# Patient Record
(demographics unavailable — no encounter records)

---

## 2024-12-18 NOTE — ASSESSMENT
[FreeTextEntry1] : 12/15/2023 we will continue the current antihypertensive regimen in addition to this we discussed lipids and the LDL target of less than 100 repeat values are planned on a regimen of a natural statin    6/18/24 requesting information with respect to non-pharmacologic statin such as red rice yeast plain stress test requested most likely GI related GI follow-up suggested as well.  Addendum 6/18/24 seeing Dr. Gudino in [place] for GI evaluation  12/18/2024  Hyperlipidemia  reviewed blood work increasing total cholesterol 232 now 250 LDL was 130 now 147. low [place] risk  would mandate a DASH diet/Mediterranean diet. for now  fatigue  would consider obstructive sleep apnea now with BMI greater than 40 family being evaluated for inflammation although workup has been unrevealing refer to Dr. Wood for sleep study  history of strokes in her father along with myocardial infarction   carotid study in our office given family history of stroke  return in 6 months seeing Dr. Doss for general medical   high risk encounter based upon lab review and recommendation for subspecialty referral and review of blood work

## 2024-12-18 NOTE — REASON FOR VISIT
[Symptom and Test Evaluation] : symptom and test evaluation [CV Risk Factors and Non-Cardiac Disease] : CV risk factors and non-cardiac disease [Hypertension] : hypertension [Coronary Artery Disease] : coronary artery disease [Follow-Up - Clinic] : a clinic follow-up of [Supraventricular Tachycardia] : supraventricular tachycardia [FreeTextEntry3] : Dr Anjelica Morales [phone] [FreeTextEntry1] : Cara comes today for a follow up visit for an abnormal lipid profile. chest pains have resolved. EKG is stable Cara  knows that she must engage in primary risk reduction include weight reduction in dietary management  Update 4/13/2022 Grupo notes occasional chest pains which last for about 5 minutes otherwise we reviewed a log which reveal blood pressure in the 150-1 60 systolic range she previously had headache on labetalol  7/12/2022 Chest pains have resolved blood pressure under good control  12/29/22 Grupo is doing well overall we have reviewed bloodwork by phone which is satisfactory she will engage in primary risk reduction is anxious to avoid further pharmacotherapy  6/27/2023  No new cardiac complaint . works as a [employer] company in [place] asymptomatic vital signs stable responding nicely to antihypertensive regimen  12/15/2023 Grupo tells me that her father suffered from angina and coronary disease and we suspect that a elevation in C-reactive protein over 10 is a risk enhancer therefore would aim for an LDL cholesterol less than 100 Grupo is open to trying a natural statin such as red yeast rice but prefers to avoid the atorvastatin meanwhile her blood pressure remains under excellent control on the current regimen.  6/18/2024 Cara reports bouts of chest pains that wake her up out of sleep most consistent with reflux. she has a history of known gastritis and previously underwent GI evaluations she has taken Zantac in the past.  Blood work is reviewed reveals LDL cholesterol of 130 total cholesterol 232 blood sugar 111  7/2/2024 Compensated without cardiac signs or symptoms   12/18/2024 Julieta complains of mostly fatigue especially after waking in the morning she can go back to sleep immediately

## 2024-12-18 NOTE — DISCUSSION/SUMMARY
[EKG obtained to assist in diagnosis and management of assessed problem(s)] : EKG obtained to assist in diagnosis and management of assessed problem(s) [FreeTextEntry1] : I would consider carvedilol 6.25/day would increase losartan to 100 mg/day blood pressure target 140/80 normal coronaries at cath she had a history of a false positive nuclear stress test.  Maintain diuretic hydrochlorothiazide.  7/12/2022 With continue current regimen  12/29/22 continue cardiac regimen for hypertension close attention to lipid profile  6/27/2023  Grupo will call me tomorrow for review of blood work   7/2/2024 No new cardiac recommendations   Medical necessity This is a low-risk encounter  EKG indicated for hypertension

## 2025-06-19 NOTE — ASSESSMENT
[FreeTextEntry1] : 12/15/2023 we will continue the current antihypertensive regimen in addition to this we discussed lipids and the LDL target of less than 100 repeat values are planned on a regimen of a natural statin    6/18/24 requesting information with respect to non-pharmacologic statin such as red rice yeast plain stress test requested most likely GI related GI follow-up suggested as well.  Addendum 6/18/24 seeing Dr. Gudino in [place] for GI evaluation  12/18/2024  Hyperlipidemia  reviewed blood work increasing total cholesterol 232 now 250 LDL was 130 now 147. low [place] risk  would mandate a DASH diet/Mediterranean diet. for now  fatigue  would consider obstructive sleep apnea now with BMI greater than 40 family being evaluated for inflammation although workup has been unrevealing refer to Dr. Wood for sleep study  history of strokes in her father along with myocardial infarction   carotid study in our office given family history of stroke  return in 6 months seeing Dr. Doss for general medical   6/18/2025 Will continue nonpharmacologic approach for hyperlipidemia blood pressure control satisfactory on current medications return visit 6 to 8 months  Intermediate risk encounter based upon lab review and recommendation

## 2025-06-19 NOTE — PHYSICAL EXAM

## 2025-06-19 NOTE — REASON FOR VISIT
[Symptom and Test Evaluation] : symptom and test evaluation [CV Risk Factors and Non-Cardiac Disease] : CV risk factors and non-cardiac disease [Hypertension] : hypertension [Coronary Artery Disease] : coronary artery disease [Follow-Up - Clinic] : a clinic follow-up of [Supraventricular Tachycardia] : supraventricular tachycardia [FreeTextEntry3] : Dr Anjelica Morales [phone] [FreeTextEntry1] : Cara comes today for a follow up visit for an abnormal lipid profile. chest pains have resolved. EKG is stable Cara  knows that she must engage in primary risk reduction include weight reduction in dietary management  Update 4/13/2022 Grupo notes occasional chest pains which last for about 5 minutes otherwise we reviewed a log which reveal blood pressure in the 150-1 60 systolic range she previously had headache on labetalol  7/12/2022 Chest pains have resolved blood pressure under good control  12/29/22 Grupo is doing well overall we have reviewed bloodwork by phone which is satisfactory she will engage in primary risk reduction is anxious to avoid further pharmacotherapy  6/27/2023  No new cardiac complaint . works as a [employer] company in [place] asymptomatic vital signs stable responding nicely to antihypertensive regimen  12/15/2023 Grupo tells me that her father suffered from angina and coronary disease and we suspect that a elevation in C-reactive protein over 10 is a risk enhancer therefore would aim for an LDL cholesterol less than 100 Grupo is open to trying a natural statin such as red yeast rice but prefers to avoid the atorvastatin meanwhile her blood pressure remains under excellent control on the current regimen.  6/18/2024 Cara reports bouts of chest pains that wake her up out of sleep most consistent with reflux. she has a history of known gastritis and previously underwent GI evaluations she has taken Zantac in the past.  Blood work is reviewed reveals LDL cholesterol of 130 total cholesterol 232 blood sugar 111  7/2/2024 Compensated without cardiac signs or symptoms   12/18/2024 Julieta complains of mostly fatigue especially after waking in the morning she can go back to sleep immediately   6/18/2025 Lipids reviewed with Grupo total cholesterol 251/249/232 LDL fraction 148/149/135 HDL 77-1 04 blood pressure 108/72 [place] risk 1.2%

## 2025-06-19 NOTE — REASON FOR VISIT
[Symptom and Test Evaluation] : symptom and test evaluation [CV Risk Factors and Non-Cardiac Disease] : CV risk factors and non-cardiac disease [Hypertension] : hypertension [Coronary Artery Disease] : coronary artery disease [Follow-Up - Clinic] : a clinic follow-up of [Supraventricular Tachycardia] : supraventricular tachycardia [FreeTextEntry3] : Dr Anjelica Morales [phone] [FreeTextEntry1] : Cara comes today for a follow up visit for an abnormal lipid profile. chest pains have resolved. EKG is stable Cara  knows that she must engage in primary risk reduction include weight reduction in dietary management  Update 4/13/2022 Grupo notes occasional chest pains which last for about 5 minutes otherwise we reviewed a log which reveal blood pressure in the 150-1 60 systolic range she previously had headache on labetalol  7/12/2022 Chest pains have resolved blood pressure under good control  12/29/22 Grupo is doing well overall we have reviewed bloodwork by phone which is satisfactory she will engage in primary risk reduction is anxious to avoid further pharmacotherapy  6/27/2023  No new cardiac complaint . works as a [employer] company in [place] asymptomatic vital signs stable responding nicely to antihypertensive regimen  12/15/2023 Grupo tells me that her father suffered from angina and coronary disease and we suspect that a elevation in C-reactive protein over 10 is a risk enhancer therefore would aim for an LDL cholesterol less than 100 Grupo is open to trying a natural statin such as red yeast rice but prefers to avoid the atorvastatin meanwhile her blood pressure remains under excellent control on the current regimen.  6/18/2024 Cara reports bouts of chest pains that wake her up out of sleep most consistent with reflux. she has a history of known gastritis and previously underwent GI evaluations she has taken Zantac in the past.  Blood work is reviewed reveals LDL cholesterol of 130 total cholesterol 232 blood sugar 111  7/2/2024 Compensated without cardiac signs or symptoms   12/18/2024 Julieta complains of mostly fatigue especially after waking in the morning she can go back to sleep immediately   6/18/2025 Lipids reviewed with Grupo total cholesterol 251/249/232 LDL fraction 148/149/135 HDL 77-1 04 blood pressure 108/72 [place] risk 1.2%

## 2025-06-19 NOTE — PHYSICAL EXAM

## 2025-06-19 NOTE — ASSESSMENT
[FreeTextEntry1] : 12/15/2023 we will continue the current antihypertensive regimen in addition to this we discussed lipids and the LDL target of less than 100 repeat values are planned on a regimen of a natural statin    6/18/24 requesting information with respect to non-pharmacologic statin such as red rice yeast plain stress test requested most likely GI related GI follow-up suggested as well.  Addendum 6/18/24 seeing Dr. Gudino in [place] for GI evaluation  12/18/2024  Hyperlipidemia  reviewed blood work increasing total cholesterol 232 now 250 LDL was 130 now 147. low [place] risk  would mandate a DASH diet/Mediterranean diet. for now  fatigue  would consider obstructive sleep apnea now with BMI greater than 40 family being evaluated for inflammation although workup has been unrevealing refer to Dr. Wood for sleep study  history of strokes in her father along with myocardial infarction   carotid study in our office given family history of stroke  return in 6 months seeing Dr. Doss for general medical   6/18/2025 Will continue nonpharmacologic approach for hyperlipidemia blood pressure control satisfactory on current medications return visit 6 to 8 months  Intermediate risk encounter based upon lab review and recommendation